# Patient Record
Sex: MALE | Race: ASIAN | NOT HISPANIC OR LATINO | Employment: FULL TIME | URBAN - METROPOLITAN AREA
[De-identification: names, ages, dates, MRNs, and addresses within clinical notes are randomized per-mention and may not be internally consistent; named-entity substitution may affect disease eponyms.]

---

## 2023-03-19 ENCOUNTER — OFFICE VISIT (OUTPATIENT)
Dept: URGENT CARE | Facility: PHYSICIAN GROUP | Age: 58
End: 2023-03-19
Payer: COMMERCIAL

## 2023-03-19 ENCOUNTER — APPOINTMENT (OUTPATIENT)
Dept: RADIOLOGY | Facility: IMAGING CENTER | Age: 58
End: 2023-03-19
Attending: NURSE PRACTITIONER
Payer: COMMERCIAL

## 2023-03-19 VITALS
BODY MASS INDEX: 27.32 KG/M2 | OXYGEN SATURATION: 98 % | WEIGHT: 170 LBS | DIASTOLIC BLOOD PRESSURE: 80 MMHG | TEMPERATURE: 97.6 F | SYSTOLIC BLOOD PRESSURE: 114 MMHG | HEART RATE: 87 BPM | HEIGHT: 66 IN

## 2023-03-19 DIAGNOSIS — M79.675 GREAT TOE PAIN, LEFT: ICD-10-CM

## 2023-03-19 DIAGNOSIS — S93.509A TOE SPRAIN, INITIAL ENCOUNTER: ICD-10-CM

## 2023-03-19 PROCEDURE — 73660 X-RAY EXAM OF TOE(S): CPT | Mod: TC,LT | Performed by: RADIOLOGY

## 2023-03-19 PROCEDURE — 99203 OFFICE O/P NEW LOW 30 MIN: CPT | Performed by: NURSE PRACTITIONER

## 2023-03-19 ASSESSMENT — ENCOUNTER SYMPTOMS
FEVER: 0
MYALGIAS: 0
CHILLS: 0
TINGLING: 0
SENSORY CHANGE: 0

## 2023-03-19 ASSESSMENT — FIBROSIS 4 INDEX: FIB4 SCORE: 1.3

## 2023-03-19 NOTE — PROGRESS NOTES
Subjective     Anmol Jalloh is a 57 y.o. male who presents with Injury (Left great toe injury happened yesterday around 1.00pm )            HPI  New problem.  Patient is a 57-year-old male who presents with left great toe injury after possibly hyperextending it yesterday.  He reports pain on the ball of his foot as well as mild swelling of the great toe.  He also reports pain on the dorsum of his foot at the base of the great toe.  He denies any redness,, ankle pain, or distal paresthesia.  He has been taking ibuprofen and icing.    Patient has no allergy information on record.  No current outpatient medications on file prior to visit.     No current facility-administered medications on file prior to visit.     Social History     Socioeconomic History    Marital status:      Spouse name: Not on file    Number of children: Not on file    Years of education: Not on file    Highest education level: Not on file   Occupational History    Not on file   Tobacco Use    Smoking status: Not on file    Smokeless tobacco: Not on file   Substance and Sexual Activity    Alcohol use: Not on file    Drug use: Not on file    Sexual activity: Not on file   Other Topics Concern    Not on file   Social History Narrative    Not on file     Social Determinants of Health     Financial Resource Strain: Not on file   Food Insecurity: Not on file   Transportation Needs: Not on file   Physical Activity: Not on file   Stress: Not on file   Social Connections: Not on file   Intimate Partner Violence: Not on file   Housing Stability: Not on file     Breast Cancer-related family history is not on file.      Review of Systems   Constitutional:  Negative for chills and fever.   Musculoskeletal:  Positive for joint pain. Negative for myalgias.   Skin: Negative.    Neurological:  Negative for tingling and sensory change.            Objective     /80 (BP Location: Left arm, Patient Position: Sitting, BP Cuff Size: Adult)   Pulse 87   " Temp 36.4 °C (97.6 °F) (Temporal)   Ht 1.676 m (5' 6\")   Wt 77.1 kg (170 lb) Comment: patient reported  SpO2 98%   BMI 27.44 kg/m²      Physical Exam  Constitutional:       Appearance: Normal appearance. He is not ill-appearing.   Cardiovascular:      Rate and Rhythm: Normal rate and regular rhythm.      Heart sounds: No murmur heard.  Pulmonary:      Effort: Pulmonary effort is normal.      Breath sounds: Normal breath sounds.   Musculoskeletal:      Left foot: Decreased range of motion. Swelling, tenderness and bony tenderness present.        Legs:    Skin:     General: Skin is warm and dry.   Neurological:      General: No focal deficit present.      Mental Status: He is alert and oriented to person, place, and time.                           Assessment & Plan        1. Toe sprain, initial encounter        2. Great toe pain, left  DX-TOE(S) 2+ LEFT        Negative imaging.  Patient placed in short boot for comfort.  Continue nsaids and icing.  Differential diagnosis, natural history, supportive care, and indications for immediate follow-up were discussed.                   "